# Patient Record
Sex: FEMALE | Employment: UNEMPLOYED | URBAN - METROPOLITAN AREA
[De-identification: names, ages, dates, MRNs, and addresses within clinical notes are randomized per-mention and may not be internally consistent; named-entity substitution may affect disease eponyms.]

---

## 2020-03-15 LAB
ABO: NORMAL
AMPHETAMINE SCREEN, URINE: ABNORMAL
ANTIBODY SCREEN: NORMAL
BARBITURATE SCREEN, URINE: ABNORMAL
BENZODIAZEPINE SCREEN, URINE: ABNORMAL
CANNABINOID SCREEN URINE: ABNORMAL
COCAINE METABOLITE SCREEN URINE: ABNORMAL
ERYTHROCYTE [DISTWIDTH] IN BLOOD BY AUTOMATED COUNT: 13.2 % (ref 11.5–14)
HCT VFR BLD CALC: 37.6 % (ref 36–46)
HEMOGLOBIN: 11.9 G/DL (ref 12–16)
Lab: ABNORMAL
MCHC RBC AUTO-ENTMCNC: 31.6 G/DL (ref 32–36)
MCV RBC AUTO: 84 FL (ref 80–100)
METHADONE SCREEN, URINE: ABNORMAL
OPIATE SCREEN, URINE: ABNORMAL
OXYCODONE SCREEN URINE: ABNORMAL
PHENCYCLIDINE SCREEN URINE: ABNORMAL
PLATELET # BLD: 306 X10E9/L (ref 150–450)
RBC # BLD: 4.46 X10E12/L (ref 4–5.2)
RH TYPE: NORMAL
WBC: 9.6 X10E9/L (ref 4.4–11.3)

## 2020-03-16 LAB
HEPATITIS B SURFACE ANTIGEN: NONREACTIVE
HEPATITIS C ANTIBODY: NONREACTIVE
HIV AG/AB: NONREACTIVE
SPECIMEN SOURCE: NORMAL
SYPHILIS TREPONEMAL ANTIBODY: NONREACTIVE

## 2020-03-17 LAB
ERYTHROCYTE [DISTWIDTH] IN BLOOD BY AUTOMATED COUNT: 13.3 % (ref 11.5–14)
HCT VFR BLD CALC: 29.6 % (ref 36–46)
HEMOGLOBIN: 9.5 G/DL (ref 12–16)
MCHC RBC AUTO-ENTMCNC: 32.1 G/DL (ref 32–36)
MCV RBC AUTO: 83 FL (ref 80–100)
PLATELET # BLD: 282 X10E9/L (ref 150–450)
RBC # BLD: 3.55 X10E12/L (ref 4–5.2)
WBC: 13.7 X10E9/L (ref 4.4–11.3)

## 2020-03-18 LAB — SURGICAL PATHOLOGY REPORT: NORMAL

## 2021-10-19 ENCOUNTER — OFFICE VISIT (OUTPATIENT)
Dept: OBGYN CLINIC | Age: 26
End: 2021-10-19
Payer: COMMERCIAL

## 2021-10-19 VITALS
DIASTOLIC BLOOD PRESSURE: 66 MMHG | HEART RATE: 88 BPM | BODY MASS INDEX: 23.04 KG/M2 | WEIGHT: 130 LBS | SYSTOLIC BLOOD PRESSURE: 108 MMHG | HEIGHT: 63 IN

## 2021-10-19 DIAGNOSIS — N91.1 AMENORRHEA, SECONDARY: ICD-10-CM

## 2021-10-19 DIAGNOSIS — Z32.01 PREGNANCY CONFIRMED BY POSITIVE URINE TEST: ICD-10-CM

## 2021-10-19 DIAGNOSIS — N91.1 AMENORRHEA, SECONDARY: Primary | ICD-10-CM

## 2021-10-19 LAB
ALBUMIN SERPL-MCNC: 3.9 G/DL (ref 3.5–4.6)
ALP BLD-CCNC: 107 U/L (ref 40–130)
ALT SERPL-CCNC: 7 U/L (ref 0–33)
ANION GAP SERPL CALCULATED.3IONS-SCNC: 11 MEQ/L (ref 9–15)
AST SERPL-CCNC: 12 U/L (ref 0–35)
BASOPHILS ABSOLUTE: 0.1 K/UL (ref 0–0.2)
BASOPHILS RELATIVE PERCENT: 0.6 %
BILIRUB SERPL-MCNC: <0.2 MG/DL (ref 0.2–0.7)
BUN BLDV-MCNC: 6 MG/DL (ref 6–20)
CALCIUM SERPL-MCNC: 8.9 MG/DL (ref 8.5–9.9)
CHLORIDE BLD-SCNC: 100 MEQ/L (ref 95–107)
CO2: 25 MEQ/L (ref 20–31)
CREAT SERPL-MCNC: 0.43 MG/DL (ref 0.5–0.9)
EOSINOPHILS ABSOLUTE: 0.1 K/UL (ref 0–0.7)
EOSINOPHILS RELATIVE PERCENT: 0.5 %
GFR AFRICAN AMERICAN: >60
GFR NON-AFRICAN AMERICAN: >60
GLOBULIN: 2.8 G/DL (ref 2.3–3.5)
GLUCOSE BLD-MCNC: 79 MG/DL (ref 70–99)
HCG, URINE, POC: POSITIVE
HCT VFR BLD CALC: 35 % (ref 37–47)
HEMOGLOBIN: 11.5 G/DL (ref 12–16)
LYMPHOCYTES ABSOLUTE: 2 K/UL (ref 1–4.8)
LYMPHOCYTES RELATIVE PERCENT: 17.9 %
Lab: ABNORMAL
MCH RBC QN AUTO: 28.2 PG (ref 27–31.3)
MCHC RBC AUTO-ENTMCNC: 33 % (ref 33–37)
MCV RBC AUTO: 85.5 FL (ref 82–100)
MONOCYTES ABSOLUTE: 0.7 K/UL (ref 0.2–0.8)
MONOCYTES RELATIVE PERCENT: 6.3 %
NEGATIVE QC PASS/FAIL: ABNORMAL
NEUTROPHILS ABSOLUTE: 8.4 K/UL (ref 1.4–6.5)
NEUTROPHILS RELATIVE PERCENT: 74.7 %
PDW BLD-RTO: 14.2 % (ref 11.5–14.5)
PLATELET # BLD: 348 K/UL (ref 130–400)
POSITIVE QC PASS/FAIL: ABNORMAL
POTASSIUM SERPL-SCNC: 3.6 MEQ/L (ref 3.4–4.9)
RBC # BLD: 4.1 M/UL (ref 4.2–5.4)
RUBELLA ANTIBODY IGG: 206.1 IU/ML
SODIUM BLD-SCNC: 136 MEQ/L (ref 135–144)
TOTAL PROTEIN: 6.7 G/DL (ref 6.3–8)
WBC # BLD: 11.2 K/UL (ref 4.8–10.8)

## 2021-10-19 PROCEDURE — G8420 CALC BMI NORM PARAMETERS: HCPCS | Performed by: OBSTETRICS & GYNECOLOGY

## 2021-10-19 PROCEDURE — 99204 OFFICE O/P NEW MOD 45 MIN: CPT | Performed by: OBSTETRICS & GYNECOLOGY

## 2021-10-19 PROCEDURE — G8484 FLU IMMUNIZE NO ADMIN: HCPCS | Performed by: OBSTETRICS & GYNECOLOGY

## 2021-10-19 PROCEDURE — 1036F TOBACCO NON-USER: CPT | Performed by: OBSTETRICS & GYNECOLOGY

## 2021-10-19 PROCEDURE — G8427 DOCREV CUR MEDS BY ELIG CLIN: HCPCS | Performed by: OBSTETRICS & GYNECOLOGY

## 2021-10-19 PROCEDURE — 81025 URINE PREGNANCY TEST: CPT | Performed by: OBSTETRICS & GYNECOLOGY

## 2021-10-19 ASSESSMENT — ENCOUNTER SYMPTOMS
COUGH: 0
ABDOMINAL PAIN: 0
APNEA: 0
ABDOMINAL DISTENTION: 0
NAUSEA: 0
DIARRHEA: 0
BLOOD IN STOOL: 0
SHORTNESS OF BREATH: 0
SORE THROAT: 0
VOMITING: 0
WHEEZING: 0
CONSTIPATION: 0

## 2021-10-19 NOTE — PROGRESS NOTES
Subjective:      Patient ID:  Amelie Welch is a 27 y.o. female with chief complaint of:  Chief Complaint   Patient presents with    Amenorrhea     Late prenantal care no c/o at this time, +FM        Patient presents today for confirmation of pregnancy. positive pregnancy test last menstrual early august. No care or ultrasound at this time. No  Bleeding no headaches no blurred vision. Late care with second baby unsure the dates but was believed to be 4 weeks early however not premature weight or staples most likely term infant      History reviewed. No pertinent past medical history. History reviewed. No pertinent surgical history. No family history on file. No current outpatient medications on file prior to visit. No current facility-administered medications on file prior to visit. Allergies:  Patient has no known allergies. Review of Systems   Constitutional: Negative for activity change, appetite change, fatigue, fever and unexpected weight change. HENT: Negative for nosebleeds and sore throat. Eyes: Negative for visual disturbance. Respiratory: Negative for apnea, cough, shortness of breath and wheezing. Cardiovascular: Negative for chest pain, palpitations and leg swelling. Gastrointestinal: Negative for abdominal distention, abdominal pain, blood in stool, constipation, diarrhea, nausea and vomiting. Endocrine: Negative for cold intolerance, heat intolerance, polydipsia and polyuria. Genitourinary: Negative for difficulty urinating, dyspareunia, dysuria, frequency, genital sores, hematuria, menstrual problem, pelvic pain, urgency, vaginal bleeding, vaginal discharge and vaginal pain. Musculoskeletal: Negative for arthralgias. Skin: Negative for rash. Neurological: Negative for dizziness, weakness, light-headedness and headaches. Hematological: Negative for adenopathy. Does not bruise/bleed easily.    Psychiatric/Behavioral: Negative for agitation, confusion, dysphoric mood and sleep disturbance. Objective:   /66   Pulse 88   Ht 5' 3\" (1.6 m)   Wt 130 lb (59 kg)   LMP 06/07/2021   BMI 23.03 kg/m²      Physical Exam  Constitutional:       Appearance: Normal appearance. She is well-developed. Eyes:      Pupils: Pupils are equal, round, and reactive to light. Cardiovascular:      Rate and Rhythm: Normal rate and regular rhythm. Heart sounds: Normal heart sounds. Pulmonary:      Effort: Pulmonary effort is normal.   Abdominal:      General: Bowel sounds are normal.      Palpations: Abdomen is soft. Comments: Fundus palpable 144 fhr   Genitourinary:     Labia:         Right: No rash, tenderness or lesion. Left: No rash, tenderness or lesion. Vagina: No vaginal discharge, erythema, tenderness or bleeding. Cervix: No discharge or lesion. Uterus: Enlarged. Not tender. Adnexa:         Right: No mass, tenderness or fullness. Left: No mass, tenderness or fullness. Musculoskeletal:      Right lower leg: No edema. Left lower leg: No edema. Neurological:      Mental Status: She is alert and oriented to person, place, and time. Psychiatric:         Mood and Affect: Mood normal.         Behavior: Behavior normal.         Assessment:       Diagnosis Orders   1. Amenorrhea, secondary  C.trachomatis N.gonorrhoeae DNA, Urine    CBC Auto Differential    Culture, Urine    Cystic fibrosis carrier study    Drug Panel 9A Screen, Urine    Hemoglobinopathy Eval (Electrophoresis)    Hepatitis B Surface Antigen    Herpes Simplex Virus (HSV) I Glycoprotein Antibody IgG    Herpes Simplex Virus (HSV) II Glycoprotein Antibody IgG    HIV Screen    POC Pregnancy Urine Qual    RPR Reflex to Titer and TPPA    Rubella antibody, IgG    Type and screen    Urinalysis    Varicella Zoster Antibody, IgG    Wet prep, genital    Comprehensive Metabolic Panel    US OB TRANSVAGINAL    US OB 14 PLUS WEEKS SINGLE OR FIRST GESTATION   2.  Pregnancy confirmed by positive urine test  C.trachomatis N.gonorrhoeae DNA, Urine    CBC Auto Differential    Culture, Urine    Cystic fibrosis carrier study    Drug Panel 9A Screen, Urine    Hemoglobinopathy Eval (Electrophoresis)    Hepatitis B Surface Antigen    Herpes Simplex Virus (HSV) I Glycoprotein Antibody IgG    Herpes Simplex Virus (HSV) II Glycoprotein Antibody IgG    HIV Screen    POC Pregnancy Urine Qual    RPR Reflex to Titer and TPPA    Rubella antibody, IgG    Type and screen    Urinalysis    Varicella Zoster Antibody, IgG    Wet prep, genital    Comprehensive Metabolic Panel    US OB TRANSVAGINAL    US OB 14 PLUS WEEKS SINGLE OR FIRST GESTATION         Plan:      Orders Placed This Encounter   Procedures    C.trachomatis N.gonorrhoeae DNA, Urine     Standing Status:   Future     Number of Occurrences:   1     Standing Expiration Date:   10/18/2022    Culture, Urine     Standing Status:   Future     Standing Expiration Date:   10/18/2022     Order Specific Question:   Specify (ex-cath, midstream, cysto, etc)?      Answer:   Midstream    Wet prep, genital     Standing Status:   Future     Number of Occurrences:   1     Standing Expiration Date:   10/18/2022    US OB TRANSVAGINAL     Standing Status:   Future     Standing Expiration Date:   10/19/2022    US OB 14 PLUS WEEKS SINGLE OR FIRST GESTATION     Standing Status:   Future     Standing Expiration Date:   10/19/2022     Order Specific Question:   Reason for exam:     Answer:   20 week routine, dating    CBC Auto Differential     Standing Status:   Future     Number of Occurrences:   1     Standing Expiration Date:   10/18/2022    Cystic fibrosis carrier study     Standing Status:   Future     Number of Occurrences:   1     Standing Expiration Date:   10/18/2022     Order Specific Question:   CF1-CF Symptom? (Y/N/Unknown)     Answer:   Unknown     Order Specific Question:   CF3-Any Family History of CF? (Y/N)     Answer:   No     Order Specific Question:   CF Speciment? Answer:   Blood    Drug Panel 9A Screen, Urine     Standing Status:   Future     Standing Expiration Date:   10/18/2022    Hemoglobinopathy Eval (Electrophoresis)     Standing Status:   Future     Number of Occurrences:   1     Standing Expiration Date:   10/18/2022    Hepatitis B Surface Antigen     Standing Status:   Future     Number of Occurrences:   1     Standing Expiration Date:   10/18/2022    Herpes Simplex Virus (HSV) I Glycoprotein Antibody IgG     Standing Status:   Future     Number of Occurrences:   1     Standing Expiration Date:   10/18/2022    Herpes Simplex Virus (HSV) II Glycoprotein Antibody IgG     Standing Status:   Future     Number of Occurrences:   1     Standing Expiration Date:   10/18/2022    HIV Screen     Standing Status:   Future     Number of Occurrences:   1     Standing Expiration Date:   10/18/2022    RPR Reflex to Titer and TPPA     Standing Status:   Future     Number of Occurrences:   1     Standing Expiration Date:   10/18/2022    Rubella antibody, IgG     Standing Status:   Future     Number of Occurrences:   1     Standing Expiration Date:   10/18/2022    Urinalysis     Standing Status:   Future     Standing Expiration Date:   10/18/2022    Varicella Zoster Antibody, IgG     Standing Status:   Future     Number of Occurrences:   1     Standing Expiration Date:   10/18/2022    Comprehensive Metabolic Panel     Standing Status:   Future     Number of Occurrences:   1     Standing Expiration Date:   10/19/2022    POC Pregnancy Urine Qual    Type and screen     Standing Status:   Future     Number of Occurrences:   1     Standing Expiration Date:   10/18/2022     No orders of the defined types were placed in this encounter. Return in about 3 weeks (around 11/9/2021).      Leta Carlos DO

## 2021-10-20 LAB
ABO/RH: NORMAL
ANTIBODY SCREEN: NORMAL
CLUE CELLS: NORMAL
HEPATITIS B SURFACE ANTIGEN INTERPRETATION: NORMAL
HIV AG/AB: NONREACTIVE
RPR: NORMAL
TRICHOMONAS PREP: NORMAL
TRICHOMONAS VAGINALIS SCREEN: POSITIVE
YEAST WET PREP: NORMAL

## 2021-10-22 LAB
C TRACH DNA GENITAL QL NAA+PROBE: NEGATIVE
HEMOGLOBIN A-1 QUANTITATION: 96.1 % (ref 95–97.9)
HEMOGLOBIN A2 QUANTITATION: 3 % (ref 2–3.5)
HEMOGLOBIN C QUANTITATION: 0 % (ref 0–0)
HEMOGLOBIN E QUANTITATION: 0 % (ref 0–0)
HEMOGLOBIN ELECTROPHORESIS: NORMAL
HEMOGLOBIN EVALUATION: NORMAL
HEMOGLOBIN F QUANTITATION: 0.9 % (ref 0–2.1)
HEMOGLOBIN OTHER: 0 % (ref 0–0)
HEMOGLOBIN S QUANTITATION: 0 % (ref 0–0)
HSV 1 GLYCOPROTEIN G AB IGG: 0.2 IV
HSV 2 GLYCOPROTEIN G AB IGG: 0.37 IV
N. GONORRHOEAE DNA: NEGATIVE
SICKLE CELL: NORMAL
VZV IGG SER QL IA: 994.4 IV

## 2021-10-23 RX ORDER — METRONIDAZOLE 500 MG/1
500 TABLET ORAL 2 TIMES DAILY
Qty: 14 TABLET | Refills: 0 | Status: SHIPPED | OUTPATIENT
Start: 2021-10-23 | End: 2021-10-30

## 2021-10-27 LAB
CYSTIC FIBROSIS 165 VARIANTS INTERP: NORMAL
CYSTIC FIBROSIS 5T VARIANT: NORMAL
CYSTIC FIBROSIS ALLELE 1: NEGATIVE
CYSTIC FIBROSIS ALLELE 2: NEGATIVE

## 2021-11-08 ENCOUNTER — HOSPITAL ENCOUNTER (OUTPATIENT)
Dept: ULTRASOUND IMAGING | Age: 26
Discharge: HOME | End: 2021-11-10
Payer: COMMERCIAL

## 2021-11-08 DIAGNOSIS — Z32.01 PREGNANCY CONFIRMED BY POSITIVE URINE TEST: ICD-10-CM

## 2021-11-08 DIAGNOSIS — N91.1 AMENORRHEA, SECONDARY: ICD-10-CM

## 2021-11-08 PROCEDURE — 76805 OB US >/= 14 WKS SNGL FETUS: CPT

## 2021-11-16 ENCOUNTER — INITIAL PRENATAL (OUTPATIENT)
Dept: OBGYN CLINIC | Age: 26
End: 2021-11-16

## 2021-11-16 VITALS
WEIGHT: 128 LBS | HEART RATE: 83 BPM | SYSTOLIC BLOOD PRESSURE: 112 MMHG | BODY MASS INDEX: 22.67 KG/M2 | DIASTOLIC BLOOD PRESSURE: 70 MMHG

## 2021-11-16 DIAGNOSIS — Z3A.21 21 WEEKS GESTATION OF PREGNANCY: ICD-10-CM

## 2021-11-16 DIAGNOSIS — Z34.80 SUPERVISION OF OTHER NORMAL PREGNANCY, ANTEPARTUM: ICD-10-CM

## 2021-11-16 DIAGNOSIS — Z34.80 SUPERVISION OF OTHER NORMAL PREGNANCY, ANTEPARTUM: Primary | ICD-10-CM

## 2021-11-16 DIAGNOSIS — Q66.89 BILATERAL CLUB FEET: ICD-10-CM

## 2021-11-16 RX ORDER — METRONIDAZOLE 500 MG/1
500 TABLET ORAL 2 TIMES DAILY
Qty: 14 TABLET | Refills: 0 | Status: SHIPPED | OUTPATIENT
Start: 2021-11-16 | End: 2021-11-23

## 2021-11-16 RX ORDER — ONDANSETRON 4 MG/1
4 TABLET, ORALLY DISINTEGRATING ORAL 3 TIMES DAILY PRN
Qty: 21 TABLET | Refills: 2 | Status: ON HOLD | OUTPATIENT
Start: 2021-11-16 | End: 2022-02-26 | Stop reason: HOSPADM

## 2021-11-19 LAB
AFP INTERPRETATION: NORMAL
AFP MOM: 1.78
AFP SPECIMEN: NORMAL
D-INHIBIN: 210 PG/ML
DATING: NORMAL
EER MATERNAL SCREEN AFP, HCG, EST, INH: NORMAL
ESTIMATED DUE DATE: NORMAL
FETUS COUNT: NORMAL
GESTATIONAL AGE CALC AT COLLECT: NORMAL
HISTORY OF ANEUPLOIDY?: NO
HISTORY/NEURAL TUBE DEFECTS: NO
INSULIN DEP. DIABETIC: NO
MATERNAL AGE AT EDD: 28.5 YR
MATERNAL WEIGHT: NORMAL
MOM FOR HCG, 2ND TRIMESTER: 0.8
MOM FOR UE3: 0.51
MOM INHIBN: 0.8
PATIENT'S HCG, 2ND TRIMESTER: NORMAL IU/L
PT AFP: 145 NG/ML
PT UE3: 1.47 NG/ML
RACE: NORMAL
SMOKING: YES

## 2022-02-25 ENCOUNTER — HOSPITAL ENCOUNTER (INPATIENT)
Age: 27
LOS: 2 days | Discharge: HOME | DRG: 560 | End: 2022-02-27
Attending: OBSTETRICS & GYNECOLOGY | Admitting: OBSTETRICS & GYNECOLOGY
Payer: COMMERCIAL

## 2022-02-25 LAB
ABO/RH: NORMAL
ALBUMIN SERPL-MCNC: 3.7 G/DL (ref 3.5–4.6)
ALP BLD-CCNC: 208 U/L (ref 40–130)
ALT SERPL-CCNC: 122 U/L (ref 0–33)
AMPHETAMINE SCREEN, URINE: ABNORMAL
ANION GAP SERPL CALCULATED.3IONS-SCNC: 14 MEQ/L (ref 9–15)
ANTIBODY SCREEN: NORMAL
AST SERPL-CCNC: 94 U/L (ref 0–35)
BACTERIA: ABNORMAL /HPF
BARBITURATE SCREEN URINE: ABNORMAL
BASOPHILS ABSOLUTE: 0 K/UL (ref 0–0.2)
BASOPHILS RELATIVE PERCENT: 0.2 %
BENZODIAZEPINE SCREEN, URINE: POSITIVE
BILIRUB SERPL-MCNC: 0.6 MG/DL (ref 0.2–0.7)
BILIRUBIN URINE: ABNORMAL
BLOOD, URINE: ABNORMAL
BUN BLDV-MCNC: 10 MG/DL (ref 6–20)
CALCIUM SERPL-MCNC: 8.9 MG/DL (ref 8.5–9.9)
CANNABINOID SCREEN URINE: POSITIVE
CHLORIDE BLD-SCNC: 93 MEQ/L (ref 95–107)
CLARITY: ABNORMAL
CO2: 29 MEQ/L (ref 20–31)
COCAINE METABOLITE SCREEN URINE: ABNORMAL
COLOR: ABNORMAL
CREAT SERPL-MCNC: 0.47 MG/DL (ref 0.5–0.9)
EOSINOPHILS ABSOLUTE: 0 K/UL (ref 0–0.7)
EOSINOPHILS RELATIVE PERCENT: 0.1 %
EPITHELIAL CELLS, UA: ABNORMAL /HPF (ref 0–5)
GFR AFRICAN AMERICAN: >60
GFR NON-AFRICAN AMERICAN: >60
GLOBULIN: 3.3 G/DL (ref 2.3–3.5)
GLUCOSE BLD-MCNC: 94 MG/DL (ref 70–99)
GLUCOSE URINE: NEGATIVE MG/DL
HCT VFR BLD CALC: 37.3 % (ref 37–47)
HEMOGLOBIN: 12.5 G/DL (ref 12–16)
HEPATITIS B SURFACE ANTIGEN INTERPRETATION: NORMAL
HIV AG/AB: NONREACTIVE
KETONES, URINE: NEGATIVE MG/DL
LEUKOCYTE ESTERASE, URINE: ABNORMAL
LYMPHOCYTES ABSOLUTE: 1 K/UL (ref 1–4.8)
LYMPHOCYTES RELATIVE PERCENT: 8 %
Lab: ABNORMAL
MCH RBC QN AUTO: 27.8 PG (ref 27–31.3)
MCHC RBC AUTO-ENTMCNC: 33.6 % (ref 33–37)
MCV RBC AUTO: 82.7 FL (ref 82–100)
METHADONE SCREEN, URINE: ABNORMAL
MONOCYTES ABSOLUTE: 0.9 K/UL (ref 0.2–0.8)
MONOCYTES RELATIVE PERCENT: 7.4 %
NEUTROPHILS ABSOLUTE: 10.5 K/UL (ref 1.4–6.5)
NEUTROPHILS RELATIVE PERCENT: 84.3 %
NITRITE, URINE: POSITIVE
OPIATE SCREEN URINE: ABNORMAL
OXYCODONE URINE: POSITIVE
PDW BLD-RTO: 13.2 % (ref 11.5–14.5)
PH UA: >=9 (ref 5–9)
PHENCYCLIDINE SCREEN URINE: ABNORMAL
PLATELET # BLD: 240 K/UL (ref 130–400)
POTASSIUM SERPL-SCNC: 2.9 MEQ/L (ref 3.4–4.9)
PROPOXYPHENE SCREEN: ABNORMAL
PROTEIN UA: >=300 MG/DL
RBC # BLD: 4.51 M/UL (ref 4.2–5.4)
RBC UA: >100 /HPF (ref 0–5)
RPR: NORMAL
RUBELLA ANTIBODY IGG: 165.5 IU/ML
SARS-COV-2, NAAT: NOT DETECTED
SODIUM BLD-SCNC: 136 MEQ/L (ref 135–144)
SPECIFIC GRAVITY UA: 1.02 (ref 1–1.03)
TOTAL PROTEIN: 7 G/DL (ref 6.3–8)
UROBILINOGEN, URINE: 0.2 E.U./DL
WBC # BLD: 12.5 K/UL (ref 4.8–10.8)
WBC UA: ABNORMAL /HPF (ref 0–5)

## 2022-02-25 PROCEDURE — 87635 SARS-COV-2 COVID-19 AMP PRB: CPT

## 2022-02-25 PROCEDURE — 59414 DELIVER PLACENTA: CPT | Performed by: OBSTETRICS & GYNECOLOGY

## 2022-02-25 PROCEDURE — 88307 TISSUE EXAM BY PATHOLOGIST: CPT

## 2022-02-25 PROCEDURE — 0HQ9XZZ REPAIR PERINEUM SKIN, EXTERNAL APPROACH: ICD-10-PCS | Performed by: OBSTETRICS & GYNECOLOGY

## 2022-02-25 PROCEDURE — 86900 BLOOD TYPING SEROLOGIC ABO: CPT

## 2022-02-25 PROCEDURE — 6360000002 HC RX W HCPCS

## 2022-02-25 PROCEDURE — 80307 DRUG TEST PRSMV CHEM ANLYZR: CPT

## 2022-02-25 PROCEDURE — 2500000003 HC RX 250 WO HCPCS

## 2022-02-25 PROCEDURE — 86901 BLOOD TYPING SEROLOGIC RH(D): CPT

## 2022-02-25 PROCEDURE — 6370000000 HC RX 637 (ALT 250 FOR IP): Performed by: OBSTETRICS & GYNECOLOGY

## 2022-02-25 PROCEDURE — 85025 COMPLETE CBC W/AUTO DIFF WBC: CPT

## 2022-02-25 PROCEDURE — 87340 HEPATITIS B SURFACE AG IA: CPT

## 2022-02-25 PROCEDURE — 86592 SYPHILIS TEST NON-TREP QUAL: CPT

## 2022-02-25 PROCEDURE — 81001 URINALYSIS AUTO W/SCOPE: CPT

## 2022-02-25 PROCEDURE — 1220000000 HC SEMI PRIVATE OB R&B

## 2022-02-25 PROCEDURE — 87389 HIV-1 AG W/HIV-1&-2 AB AG IA: CPT

## 2022-02-25 PROCEDURE — 2580000003 HC RX 258

## 2022-02-25 PROCEDURE — 86850 RBC ANTIBODY SCREEN: CPT

## 2022-02-25 PROCEDURE — 86762 RUBELLA ANTIBODY: CPT

## 2022-02-25 PROCEDURE — 80053 COMPREHEN METABOLIC PANEL: CPT

## 2022-02-25 RX ORDER — ONDANSETRON 2 MG/ML
4 INJECTION INTRAMUSCULAR; INTRAVENOUS EVERY 6 HOURS PRN
Status: DISCONTINUED | OUTPATIENT
Start: 2022-02-25 | End: 2022-02-27 | Stop reason: HOSPADM

## 2022-02-25 RX ORDER — SODIUM CHLORIDE, SODIUM LACTATE, POTASSIUM CHLORIDE, CALCIUM CHLORIDE 600; 310; 30; 20 MG/100ML; MG/100ML; MG/100ML; MG/100ML
INJECTION, SOLUTION INTRAVENOUS CONTINUOUS
Status: DISCONTINUED | OUTPATIENT
Start: 2022-02-25 | End: 2022-02-25

## 2022-02-25 RX ORDER — SODIUM CHLORIDE, SODIUM LACTATE, POTASSIUM CHLORIDE, AND CALCIUM CHLORIDE .6; .31; .03; .02 G/100ML; G/100ML; G/100ML; G/100ML
500 INJECTION, SOLUTION INTRAVENOUS PRN
Status: DISCONTINUED | OUTPATIENT
Start: 2022-02-25 | End: 2022-02-25

## 2022-02-25 RX ORDER — LIDOCAINE HYDROCHLORIDE 10 MG/ML
INJECTION, SOLUTION EPIDURAL; INFILTRATION; INTRACAUDAL; PERINEURAL
Status: COMPLETED
Start: 2022-02-25 | End: 2022-02-25

## 2022-02-25 RX ORDER — ACETAMINOPHEN 325 MG/1
650 TABLET ORAL EVERY 4 HOURS PRN
Status: DISCONTINUED | OUTPATIENT
Start: 2022-02-25 | End: 2022-02-25

## 2022-02-25 RX ORDER — DOCUSATE SODIUM 100 MG/1
100 CAPSULE, LIQUID FILLED ORAL 2 TIMES DAILY PRN
Status: DISCONTINUED | OUTPATIENT
Start: 2022-02-25 | End: 2022-02-25

## 2022-02-25 RX ORDER — SODIUM CHLORIDE 9 MG/ML
25 INJECTION, SOLUTION INTRAVENOUS PRN
Status: DISCONTINUED | OUTPATIENT
Start: 2022-02-25 | End: 2022-02-25

## 2022-02-25 RX ORDER — OXYCODONE HYDROCHLORIDE AND ACETAMINOPHEN 5; 325 MG/1; MG/1
2 TABLET ORAL EVERY 4 HOURS PRN
Status: DISCONTINUED | OUTPATIENT
Start: 2022-02-25 | End: 2022-02-27 | Stop reason: HOSPADM

## 2022-02-25 RX ORDER — IBUPROFEN 600 MG/1
600 TABLET ORAL EVERY 6 HOURS PRN
Status: DISCONTINUED | OUTPATIENT
Start: 2022-02-25 | End: 2022-02-27 | Stop reason: HOSPADM

## 2022-02-25 RX ORDER — OXYCODONE HYDROCHLORIDE AND ACETAMINOPHEN 5; 325 MG/1; MG/1
1 TABLET ORAL EVERY 4 HOURS PRN
Status: DISCONTINUED | OUTPATIENT
Start: 2022-02-25 | End: 2022-02-27 | Stop reason: HOSPADM

## 2022-02-25 RX ORDER — OXYTOCIN 10 [USP'U]/ML
INJECTION, SOLUTION INTRAMUSCULAR; INTRAVENOUS
Status: DISCONTINUED
Start: 2022-02-25 | End: 2022-02-25

## 2022-02-25 RX ORDER — DIPHENHYDRAMINE HCL 25 MG
25 TABLET ORAL EVERY 4 HOURS PRN
Status: DISCONTINUED | OUTPATIENT
Start: 2022-02-25 | End: 2022-02-25

## 2022-02-25 RX ORDER — SODIUM CHLORIDE 0.9 % (FLUSH) 0.9 %
5-40 SYRINGE (ML) INJECTION EVERY 12 HOURS SCHEDULED
Status: DISCONTINUED | OUTPATIENT
Start: 2022-02-25 | End: 2022-02-27 | Stop reason: HOSPADM

## 2022-02-25 RX ORDER — DOCUSATE SODIUM 100 MG/1
100 CAPSULE, LIQUID FILLED ORAL DAILY
Status: DISCONTINUED | OUTPATIENT
Start: 2022-02-25 | End: 2022-02-27 | Stop reason: HOSPADM

## 2022-02-25 RX ORDER — ACETAMINOPHEN 325 MG/1
650 TABLET ORAL EVERY 4 HOURS PRN
Status: DISCONTINUED | OUTPATIENT
Start: 2022-02-25 | End: 2022-02-27 | Stop reason: HOSPADM

## 2022-02-25 RX ORDER — SODIUM CHLORIDE, SODIUM LACTATE, POTASSIUM CHLORIDE, CALCIUM CHLORIDE 600; 310; 30; 20 MG/100ML; MG/100ML; MG/100ML; MG/100ML
INJECTION, SOLUTION INTRAVENOUS
Status: COMPLETED
Start: 2022-02-25 | End: 2022-02-25

## 2022-02-25 RX ORDER — POTASSIUM CHLORIDE 750 MG/1
40 TABLET, FILM COATED, EXTENDED RELEASE ORAL ONCE
Status: COMPLETED | OUTPATIENT
Start: 2022-02-25 | End: 2022-02-25

## 2022-02-25 RX ORDER — SODIUM CHLORIDE 0.9 % (FLUSH) 0.9 %
5-40 SYRINGE (ML) INJECTION PRN
Status: DISCONTINUED | OUTPATIENT
Start: 2022-02-25 | End: 2022-02-25

## 2022-02-25 RX ORDER — SODIUM CHLORIDE 9 MG/ML
25 INJECTION, SOLUTION INTRAVENOUS PRN
Status: DISCONTINUED | OUTPATIENT
Start: 2022-02-25 | End: 2022-02-27 | Stop reason: HOSPADM

## 2022-02-25 RX ORDER — NALBUPHINE HCL 10 MG/ML
5 AMPUL (ML) INJECTION
Status: DISCONTINUED | OUTPATIENT
Start: 2022-02-25 | End: 2022-02-25

## 2022-02-25 RX ORDER — SODIUM CHLORIDE 0.9 % (FLUSH) 0.9 %
5-40 SYRINGE (ML) INJECTION PRN
Status: DISCONTINUED | OUTPATIENT
Start: 2022-02-25 | End: 2022-02-27 | Stop reason: HOSPADM

## 2022-02-25 RX ORDER — BISACODYL 10 MG
10 SUPPOSITORY, RECTAL RECTAL DAILY PRN
Status: DISCONTINUED | OUTPATIENT
Start: 2022-02-25 | End: 2022-02-27 | Stop reason: HOSPADM

## 2022-02-25 RX ORDER — MODIFIED LANOLIN
OINTMENT (GRAM) TOPICAL PRN
Status: DISCONTINUED | OUTPATIENT
Start: 2022-02-25 | End: 2022-02-27 | Stop reason: HOSPADM

## 2022-02-25 RX ORDER — SODIUM CHLORIDE 0.9 % (FLUSH) 0.9 %
5-40 SYRINGE (ML) INJECTION EVERY 12 HOURS SCHEDULED
Status: DISCONTINUED | OUTPATIENT
Start: 2022-02-25 | End: 2022-02-25

## 2022-02-25 RX ORDER — ONDANSETRON 2 MG/ML
4 INJECTION INTRAMUSCULAR; INTRAVENOUS EVERY 6 HOURS PRN
Status: DISCONTINUED | OUTPATIENT
Start: 2022-02-25 | End: 2022-02-25

## 2022-02-25 RX ORDER — SODIUM CHLORIDE, SODIUM LACTATE, POTASSIUM CHLORIDE, AND CALCIUM CHLORIDE .6; .31; .03; .02 G/100ML; G/100ML; G/100ML; G/100ML
1000 INJECTION, SOLUTION INTRAVENOUS PRN
Status: DISCONTINUED | OUTPATIENT
Start: 2022-02-25 | End: 2022-02-25

## 2022-02-25 RX ORDER — IBUPROFEN 600 MG/1
600 TABLET ORAL EVERY 6 HOURS PRN
Status: DISCONTINUED | OUTPATIENT
Start: 2022-02-25 | End: 2022-02-25

## 2022-02-25 RX ADMIN — IBUPROFEN 600 MG: 600 TABLET ORAL at 18:20

## 2022-02-25 RX ADMIN — BENZOCAINE AND LEVOMENTHOL: 200; 5 SPRAY TOPICAL at 02:58

## 2022-02-25 RX ADMIN — POTASSIUM CHLORIDE 40 MEQ: 750 TABLET, FILM COATED, EXTENDED RELEASE ORAL at 05:04

## 2022-02-25 RX ADMIN — LIDOCAINE HYDROCHLORIDE 30 ML: 10 INJECTION, SOLUTION EPIDURAL; INFILTRATION; INTRACAUDAL; PERINEURAL at 02:39

## 2022-02-25 RX ADMIN — IBUPROFEN 600 MG: 600 TABLET ORAL at 11:17

## 2022-02-25 RX ADMIN — DOCUSATE SODIUM 100 MG: 100 CAPSULE, LIQUID FILLED ORAL at 09:20

## 2022-02-25 RX ADMIN — BENZOCAINE AND LEVOMENTHOL: 200; 5 SPRAY TOPICAL at 05:47

## 2022-02-25 RX ADMIN — SODIUM CHLORIDE, POTASSIUM CHLORIDE, SODIUM LACTATE AND CALCIUM CHLORIDE 1000 ML: 600; 310; 30; 20 INJECTION, SOLUTION INTRAVENOUS at 02:45

## 2022-02-25 RX ADMIN — Medication 30 UNITS: at 02:25

## 2022-02-25 RX ADMIN — OXYCODONE AND ACETAMINOPHEN 1 TABLET: 5; 325 TABLET ORAL at 06:36

## 2022-02-25 RX ADMIN — IBUPROFEN 600 MG: 600 TABLET ORAL at 02:58

## 2022-02-25 ASSESSMENT — PAIN SCALES - GENERAL
PAINLEVEL_OUTOF10: 2
PAINLEVEL_OUTOF10: 7
PAINLEVEL_OUTOF10: 6
PAINLEVEL_OUTOF10: 7
PAINLEVEL_OUTOF10: 8
PAINLEVEL_OUTOF10: 6
PAINLEVEL_OUTOF10: 4

## 2022-02-25 NOTE — H&P
Department of Obstetrics and Gynecology   History & Physical    Pt Name: Reanna Byrne  MRN: [id number] [place] #: [de-identified]  YOB: 1995  Estimated Date of Delivery: 3/28/22      HPI: The patient is a 27 y.o. [id number] 29w2d female who presents to [facility] triage for precipitous delivery at home. PT brought in via EMS. Pt states she started with CTX and delivered infant at home. Pt with limited/scant prenatal care with only one visit noted in . Pt reports h/o  x 2. Pt with retained placenta on entry to L&D. Allergies: Allergies as of 2022    (No Known Allergies)       Medications:    Current Facility-Administered Medications:     oxytocin (PITOCIN) 10 UNIT/ML injection, , , ,     oxytocin (PITOCIN) 30 units in 500 mL infusion Override Pull, , , ,     lidocaine PF 1 % injection, , , ,     OB History: [id number]    Gyn History: Denies h/o abnormal pap smear, h/o STDs. Past Medical History: No past medical history on file. Past Surgical History: No past surgical history on file. Social History:   Social History     Tobacco Use   Smoking Status Never Smoker   Smokeless Tobacco Never Used        Family History: Noncontributory; Denies h/o cancer. ROS:  Negative except as stated in HPI, denies nausea, vomiting, fever, chills, headache or dysuria. PE:  There were no vitals filed for this visit.     General: well nourished, well developed, in no acute distress  CV: Normal heart sounds  Resp: breathing unlabored  Abdomen: Nontender, no rebound, no guarding    Labs:   Blood Type/Rh: O POS    Group B Strep:  unknown    Assessment:   27 y.o. [id number] 35w4d female with precipitous delivery at home with retained placenta    Plan:   Admit for delivery    Brendon Simpson MD

## 2022-02-25 NOTE — FLOWSHEET NOTE
Encouraged patient to get up to restroom for urine sample and patient has not urinated since arrival. Discussed insertion of petit catheter. Patient up to restroom at this time. 4 mls out at this time. Sent to lab.  Encouraged patient to drink water and get up again

## 2022-02-25 NOTE — PLAN OF CARE
Problem: Discharge Planning:  Goal: Discharged to appropriate level of care  Description: Discharged to appropriate level of care  2/25/2022 1036 by Jermaine Carbajal RN  Outcome: Ongoing  2/25/2022 0601 by Jigar Melgar RN  Outcome: Ongoing     Problem: Constipation:  Goal: Bowel elimination is within specified parameters  Description: Bowel elimination is within specified parameters  2/25/2022 1036 by Jermaine Carbajal RN  Outcome: Ongoing  2/25/2022 0601 by Jigar Melgar RN  Outcome: Ongoing     Problem: Fluid Volume - Imbalance:  Goal: Absence of imbalanced fluid volume signs and symptoms  Description: Absence of imbalanced fluid volume signs and symptoms  2/25/2022 1036 by Jermaine Carbajal RN  Outcome: Ongoing  2/25/2022 0601 by Jigar Melgar RN  Outcome: Ongoing  Goal: Absence of postpartum hemorrhage signs and symptoms  Description: Absence of postpartum hemorrhage signs and symptoms  2/25/2022 1036 by Jermaine Carbajal RN  Outcome: Ongoing  2/25/2022 0601 by Jigar Melgar RN  Outcome: Ongoing     Problem: Infection - Risk of, Puerperal Infection:  Goal: Will show no infection signs and symptoms  Description: Will show no infection signs and symptoms  2/25/2022 1036 by Jermaine Carbajal RN  Outcome: Ongoing  2/25/2022 0601 by Jigar Melgar RN  Outcome: Ongoing     Problem: Mood - Altered:  Goal: Mood stable  Description: Mood stable  2/25/2022 1036 by Jermaine Carbajal RN  Outcome: Ongoing  2/25/2022 0601 by Jigar Melgar RN  Outcome: Ongoing     Problem: Pain - Acute:  Goal: Pain level will decrease  Description: Pain level will decrease  2/25/2022 1036 by Jermaine Carbajal RN  Outcome: Ongoing  2/25/2022 0601 by Jigar Melgar RN  Outcome: Ongoing

## 2022-02-25 NOTE — FLOWSHEET NOTE
In patients room. Encouraged her to get up to the bathroom. Discussed implications of full bladder. She wanted to sleep more. I offered bedpan and petit catheter.  Patient up to restroom at this time

## 2022-02-25 NOTE — OP NOTE
PROCEDURE NOTE: VAGINAL DELIVERY  27 y.o. [id number] at 35w4d GA who presented to L&D for precipitous delivery at home. Pt reports CTX followed by [facility] and desire to push. Pt reports infant was delivered without issue and EMS was called. EMS then clamped and cut umbilical cord. Infant brought to L&D viable and active. The pt did not deliver the placenta at home. On exam in L&D the placenta delivered spontaneously with fundal massage and gentle traction. The vagina and cervix were inspected and a first degree midline laceration was noted and repaired with interrupted stitch. The infant, a viable male infant was born at approximately 1:21 with Apgars 8 and 9, per EMS and wt pending. Bilateral club feet noted on infant. Mother was noted to have excellent uterine tone. Sponge and instrument counts were correct x 2 and mother and baby were recovered in room without difficulty.      EBL: 200cc - please see nursing notes/charting for further EBL    Uriel Bright MD

## 2022-02-25 NOTE — CARE COORDINATION
LSW spoke with nursing regarding concerns for pt. Pt delivered at home and had only one prenatal visit during her pregnancy. Pt was reluctant to give a urine sample and threw away baby's diaper so we could not test.  When pt finally gave a small sample it was positive benzo and THC and Opiate. Pt did get a percocet here upon arrival.  Pt says that she lives at home with her other kids. FOB was here but they do not live together. Pt's sister was present in the room and is providing care for the new baby. LSW called [facility] and spoke with Luma in intake. Referral information was provided and [facility] will follow with pt. Pt has a past hx with her last delivery and was positive for opiates and THC. [facility] worker (FREDIS [phone]) here to see pt. Pt has a past hx of drug use. [facility] is requesting that we keep baby to monitor for withdrawal as we have no prenatal screens to go by to know the level of exposure.

## 2022-02-26 PROCEDURE — 6370000000 HC RX 637 (ALT 250 FOR IP): Performed by: OBSTETRICS & GYNECOLOGY

## 2022-02-26 PROCEDURE — 7200000001 HC VAGINAL DELIVERY

## 2022-02-26 PROCEDURE — 1220000000 HC SEMI PRIVATE OB R&B

## 2022-02-26 PROCEDURE — 99238 HOSP IP/OBS DSCHRG MGMT 30/<: CPT | Performed by: OBSTETRICS & GYNECOLOGY

## 2022-02-26 RX ADMIN — DOCUSATE SODIUM 100 MG: 100 CAPSULE, LIQUID FILLED ORAL at 08:45

## 2022-02-26 RX ADMIN — IBUPROFEN 600 MG: 600 TABLET ORAL at 02:57

## 2022-02-26 ASSESSMENT — PAIN SCALES - GENERAL
PAINLEVEL_OUTOF10: 0
PAINLEVEL_OUTOF10: 7
PAINLEVEL_OUTOF10: 0
PAINLEVEL_OUTOF10: 0

## 2022-02-26 ASSESSMENT — ENCOUNTER SYMPTOMS
NAUSEA: 0
ABDOMINAL PAIN: 0
COUGH: 0
SORE THROAT: 0
SHORTNESS OF BREATH: 0

## 2022-02-26 NOTE — FLOWSHEET NOTE
Patient tried to leave floor. Explained that hospital is liable. Educated her on importance of staying.  Patient considering leaving AMA at this time

## 2022-02-26 NOTE — DISCHARGE SUMMARY
Discharge Summary    Date: 2022  Patient Name: Suzie López YOB: 1995 Age: 27 y.o. Admit Date: 2022  Discharge Date: 2022  Discharge Condition: Stable    Admission Diagnosis   labor with delivery (O60.10X0)     Discharge Diagnosis  Principal Problem:  labor with deliveryResolved Problems: * No resolved hospital problems. [facility] Stay  Narrative of Hospital Course:      Consultants:  IP CONSULT TO SOCIAL WORK    Surgeries/procedures Performed:       Treatments:           Discharge Plan/Disposition:  Home    Hospital/Incidental Findings Requiring Follow Up:    Patient Instructions:    Diet: Regular Diet    Activity:Activity as Tolerated  For number of days (if applicable): Other Instructions: F/u w/ob/gyn in 6 wks    Provider Follow-Up:   No follow-ups on file.      Significant Diagnostic Studies:    Recent Labs:  Admission on BO/Rh                                      Date: 2022Value: O POS         Status: FinalAntibody Screen                               Date: 2022Value: NEG           Status: [facility] [facility]                                           Date: 2022Value: 12.5*       Ref range: 4.8 - 10.8 K/uL    Status: FinalRBC                                           Date: 2022Value: 4.51        Ref range: 4.20 - 5.40 M/uL   Status: FinalHemoglobin                                    Date: 2022Value: 12.5        Ref range: 12.0 - 16.0 g/dL   Status: FinalHematocrit                                    Date: 2022Value: 37.3        Ref range: 37.0 - 47.0 %      Status: FinalMCV                                           Date: 2022Value: 82.7        Ref range: 82.0 - 100.0 fL    Status: [address]                                           Date: 2022Value: 27.8        Ref range: 27.0 - 31.3 pg     Status:  [address]                                          Date: 2022Value: 33.6        Ref range: 33.0 - 37.0 % Status: FinalRDW                                           Date: 02/25/2022Value: 13.2        Ref range: 11.5 - 14.5 %      Status: FinalPlatelets                                     Date: 02/25/2022Value: 240         Ref range: 130 - 400 K/uL     Status: FinalNeutrophils %                                 Date: 02/25/2022Value: 84.3        Ref range: %                  Status: FinalLymphocytes %                                 Date: 02/25/2022Value: 8.0         Ref range: %                  Status: FinalMonocytes %                                   Date: 02/25/2022Value: 7.4         Ref range: %                  Status: FinalEosinophils %                                 Date: 02/25/2022Value: 0.1         Ref range: %                  Status: FinalBasophils %                                   Date: 02/25/2022Value: 0.2         Ref range: %                  Status: FinalNeutrophils Absolute                          Date: 02/25/2022Value: 10.5*       Ref range: 1.4 - 6.5 K/uL     Status: FinalLymphocytes Absolute                          Date: 02/25/2022Value: 1.0         Ref range: 1.0 - 4.8 K/uL     Status: FinalMonocytes Absolute                            Date: 02/25/2022Value: 0.9*        Ref range: 0.2 - 0.8 K/uL     Status: FinalEosinophils Absolute                          Date: 02/25/2022Value: 0.0         Ref range: 0.0 - 0.7 K/uL     Status: FinalBasophils Absolute                            Date: 02/25/2022Value: 0.0         Ref range: 0.0 - 0.2 K/uL     Status: FinalHep B S Ag Interp                             Date: 02/25/2022Value: Non-reactive                     Status: FinalRPR                                           Date: 02/25/2022Value: Non-reactive                   Ref range: Non-reactive       Status: FinalRubella Antibody IgG                          Date: 02/25/2022Value: 165.5       Ref range: IU/mL              Status: Final              Comment: Patient's result indicates immunity. Default Normal Ranges>=10 Presumed Immune<10  Presumed Not immuneHIV Ag/Ab                                     Date: 02/25/2022Value: NONREACTIVE Ref range: NR                 Status: Final              Comment: No laboratory evidence of HIV infection. If acute HIV infection issuspected, consider testing for HIV-1 RNA. [address] [place] [address] Drive [address] [facility], [address] [place] [address] ([phone]                                        Date: 02/25/2022Value: 136         Ref range: 135 - 144 mEq/L    Status: FinalPotassium                                     Date: 02/25/2022Value: 2.9*        Ref range: 3.4 - 4.9 mEq/L    Status: Final              Comment: VERIFIED BY REPEAT ANALYSISChloride                                      Date: 02/25/2022Value: 93*         Ref range: 95 - 107 mEq/L     Status: FinalCO2                                           Date: 02/25/2022Value: 29          Ref range: 20 - 31 mEq/L      Status: FinalAnion Gap                                     Date: 02/25/2022Value: 14          Ref range: 9 - 15 mEq/L       Status: FinalGlucose                                       Date: 02/25/2022Value: 94          Ref range: 70 - 99 mg/dL      Status: FinalBUN                                           Date: 02/25/2022Value: 10          Ref range: 6 - 20 mg/dL       Status: FinalCREATININE                                    Date: 02/25/2022Value: 0.47*       Ref range: 0.50 - 0.90 mg/dL  Status: FinalGFR Non-                      Date: 02/25/2022Value: >60.0       Ref range: >60                Status: Final              Comment: >60 mL/min/1.73m2 EGFR, calc. for ages 25 and older using theMDRD formula (not corrected for weight), is valid for stablerenal function. GFR                           Date: 02/25/2022Value: >60.0       Ref range: >60                Status: Final              Comment: >60 mL/min/1.73m2 EGFR, calc. for ages 25 and older using theMDRD formula (not range: Straw/Yellow       Status: FinalClarity, UA                                   Date: 02/25/2022Value: TURBID*     Ref range: Clear              Status: FinalGlucose, Ur                                   Date: 02/25/2022Value: Negative    Ref range: Negative mg/dL     Status: FinalBilirubin Urine                               Date: 02/25/2022Value: MODERATE*   Ref range: Negative           Status: FinalKetones, Urine                                Date: 02/25/2022Value: Negative    Ref range: Negative mg/dL     Status: FinalSpecific Gravity, UA                          Date: 02/25/2022Value: 1.025       Ref range: 1.005 - 1.030      Status: FinalBlood, Urine                                  Date: 02/25/2022Value: MODERATE*   Ref range: Negative           Status: FinalpH, UA                                        Date: 02/25/2022Value: >=9.0       Ref range: 5.0 - 9.0          Status: FinalProtein, UA                                   Date: 02/25/2022Value: >=300*      Ref range: Negative mg/dL     Status: FinalUrobilinogen, Urine                           Date: 02/25/2022Value: 0.2         Ref range: <2.0 E.U./dL       Status: FinalNitrite, Urine                                Date: 02/25/2022Value: POSITIVE*   Ref range: Negative           Status: FinalLeukocyte Esterase, Urine                     Date: 02/25/2022Value: LARGE*      Ref range: Negative           Status: FinalAmphetamine Screen, Urine                     Date: 02/25/2022Value: Neg         Ref range: Negative <1000 n*  Status: FinalBarbiturate Screen, Ur                        Date: 02/25/2022Value: Neg         Ref range: Negative < 200 n*  Status: FinalBenzodiazepine Screen, Urine                  Date: 02/25/2022Value: POSITIVE*   Ref range: Negative < 200 n*  Status: FinalCannabinoid Scrn, Ur                          Date: 02/25/2022Value: POSITIVE*   Ref range: Negative < 50 ng*  Status: FinalCocaine Metabolite Screen, Urine 2    Current Discharge Medication List    Time Spent on Discharge:E] minutes were spent in patient examination, evaluation, counseling as well as medication reconciliation, prescriptions for required medications, discharge plan, and follow up.     Electronically signed by Cesar Henning DO on 2/26/22 at 2:35 PM EST

## 2022-02-26 NOTE — PROGRESS NOTES
Discharge papers brought to patient for review, questions answered, pt would like to wait to be discharged till later tonight

## 2022-02-26 NOTE — PROGRESS NOTES
Pt moved to room 329 from room 321, pt oriented to room and call light, mother and boyfriend supportive in room

## 2022-02-26 NOTE — PROGRESS NOTES
Progress Note  Date:2022       Room:0321/0321-01  Patient 1 Medical [facility]      YOB: 1995     Age:26 y.o. Subjective    Subjective:  Symptoms:  Stable. No shortness of breath, cough or chest pain. Diet:  Adequate intake. No nausea. Activity level: Normal.    Pain:  She complains of pain that is mild. She reports pain is improving. Pain is well controlled. Review of Systems   Constitutional: Negative for fever. HENT: Negative for sore throat. Respiratory: Negative for cough and shortness of breath. Cardiovascular: Negative for chest pain. Gastrointestinal: Negative for abdominal pain and nausea. Genitourinary: Negative for difficulty urinating. Neurological: Negative for dizziness. All other systems reviewed and are negative. Objective         Vitals Last 24 Hours:  TEMPERATURE:  Temp  Av.9 °F (36.6 °C)  Min: 97.6 °F (36.4 °C)  Max: 98.1 °F (36.7 °C)  RESPIRATIONS RANGE: Resp  Av  Min: 16  Max: 18  PULSE OXIMETRY RANGE: No data recorded  PULSE RANGE: Pulse  Av.3  Min: 73  Max: 88  BLOOD PRESSURE RANGE: Systolic (21NMH), AJD:458 , Min:101 , MJX:957   ; Diastolic (74PQA), VQM:54, Min:57, Max:75    I/O (24Hr): Intake/Output Summary (Last 24 hours) at 2022 0944  Last data filed at 2022 1345  Gross per 24 hour   Intake --   Output 200 ml   Net -200 ml     Objective:  General Appearance:  Comfortable, well-appearing and in no acute distress. Vital signs: (most recent): Blood pressure (!) 105/57, pulse 83, temperature 97.9 °F (36.6 °C), temperature source Oral, resp. rate 16, last menstrual period 2021, unknown if currently breastfeeding. Vital signs are normal.  No fever. Output: Producing urine. HEENT: Normal HEENT exam.    Lungs:  Normal effort. Heart: Normal rate. Abdomen: Abdomen is soft. There is no abdominal tenderness. (Uterine fundus firm  Lochia minimal). Neurological: Patient is alert.     Skin: Warm and dry. Labs/Imaging/Diagnostics    Labs:  CBC:  Recent Labs     22   WBC 12.5*   RBC 4.51   HGB 12.5   HCT 37.3   MCV 82.7   RDW 13.2        CHEMISTRIES:  Recent Labs     22      K 2.9*   CL 93*   CO2 29   BUN 10   CREATININE 0.47*   GLUCOSE 94     PT/INR:No results for input(s): PROTIME, INR in the last 72 hours. APTT:No results for input(s): APTT in the last 72 hours. LIVER PROFILE:  Recent Labs     22   AST 94*   *   BILITOT 0.6   ALKPHOS 208*       Imaging Last 24 Hours:  No results found. Assessment//Plan           Hospital Problems           Last Modified POA    * (Principal)  labor with delivery 2022 Yes        Assessment:    Condition: In stable condition. Improving. (S/p  PPD1). Plan:   Encourage ambulation. Regular diet. (Cont post partum care).        Electronically signed by Pierre Galloway DO on 22 at 9:44 AM EST

## 2022-02-27 VITALS
SYSTOLIC BLOOD PRESSURE: 120 MMHG | RESPIRATION RATE: 16 BRPM | TEMPERATURE: 98 F | DIASTOLIC BLOOD PRESSURE: 62 MMHG | HEART RATE: 80 BPM

## 2022-02-27 PROCEDURE — 6370000000 HC RX 637 (ALT 250 FOR IP): Performed by: OBSTETRICS & GYNECOLOGY

## 2022-02-27 PROCEDURE — 99238 HOSP IP/OBS DSCHRG MGMT 30/<: CPT | Performed by: OBSTETRICS & GYNECOLOGY

## 2022-02-27 RX ADMIN — DOCUSATE SODIUM 100 MG: 100 CAPSULE, LIQUID FILLED ORAL at 09:05

## 2022-02-27 RX ADMIN — IBUPROFEN 600 MG: 600 TABLET ORAL at 09:05

## 2022-02-27 ASSESSMENT — PAIN SCALES - GENERAL
PAINLEVEL_OUTOF10: 0
PAINLEVEL_OUTOF10: 6

## 2022-02-27 NOTE — DISCHARGE SUMMARY
Discharge Summary    Date: 2022  Patient Name: Riky Monroe YOB: 1995 Age: 27 y.o. Admit Date: 2022  Discharge Date: 2022  Discharge Condition: Good    Admission Diagnosis   labor with delivery (O60.10X0)     Discharge Diagnosis  Principal Problem (Resolved):  labor with deliveryActive Problems: * No active hospital problems. [facility] Stay  Narrative of Hospital Course:  Stable postpartum day #2 vaginal delivery    Consultants:  IP CONSULT TO SOCIAL WORK    Surgeries/procedures Performed:       Treatments:           Discharge Plan/Disposition:  Home    Hospital/Incidental Findings Requiring Follow Up:    Patient Instructions:    Diet: Regular Diet    Activity:Activiity as Tolerated, No Driving for Today, No Lifting, Driving or Strenuous Excercise, No Driving for 2 Weeks, No Sex for, No Driving While on Analgesics and No Heavy Lifting  For number of days (if applicable): Other Instructions:    Provider Follow-Up:   No follow-ups on file.      Significant Diagnostic Studies:    Recent Labs:  Admission on BO/Rh                                      Date: 2022Value: O POS         Status: FinalAntibody Screen                               Date: 2022Value: NEG           Status: [address] [facility]                                           Date: 2022Value: 12.5*       Ref range: 4.8 - 10.8 K/uL    Status: FinalRBC                                           Date: 2022Value: 4.51        Ref range: 4.20 - 5.40 M/uL   Status: FinalHemoglobin                                    Date: 2022Value: 12.5        Ref range: 12.0 - 16.0 g/dL   Status: FinalHematocrit                                    Date: 2022Value: 37.3        Ref range: 37.0 - 47.0 %      Status: FinalMCV                                           Date: 2022Value: 82.7        Ref range: 82.0 - 100.0 fL    Status: [address] [facility]                                           Date: 02/25/2022Value: 27.8        Ref range: 27.0 - 31.3 pg     Status: [address]                                          Date: 02/25/2022Value: 33.6        Ref range: 33.0 - 37.0 %      Status: FinalRDW                                           Date: 02/25/2022Value: 13.2        Ref range: 11.5 - 14.5 %      Status: FinalPlatelets                                     Date: 02/25/2022Value: 240         Ref range: 130 - 400 K/uL     Status: FinalNeutrophils %                                 Date: 02/25/2022Value: 84.3        Ref range: %                  Status: FinalLymphocytes %                                 Date: 02/25/2022Value: 8.0         Ref range: %                  Status: FinalMonocytes %                                   Date: 02/25/2022Value: 7.4         Ref range: %                  Status: FinalEosinophils %                                 Date: 02/25/2022Value: 0.1         Ref range: %                  Status: FinalBasophils %                                   Date: 02/25/2022Value: 0.2         Ref range: %                  Status: FinalNeutrophils Absolute                          Date: 02/25/2022Value: 10.5*       Ref range: 1.4 - 6.5 K/uL     Status: FinalLymphocytes Absolute                          Date: 02/25/2022Value: 1.0         Ref range: 1.0 - 4.8 K/uL     Status: FinalMonocytes Absolute                            Date: 02/25/2022Value: 0.9*        Ref range: 0.2 - 0.8 K/uL     Status: FinalEosinophils Absolute                          Date: 02/25/2022Value: 0.0         Ref range: 0.0 - 0.7 K/uL     Status: FinalBasophils Absolute                            Date: 02/25/2022Value: 0.0         Ref range: 0.0 - 0.2 K/uL     Status: FinalHep B S Ag Interp                             Date: 02/25/2022Value: Non-reactive                     Status: FinalRPR                                           Date: 02/25/2022Value: Non-reactive                   Ref range: Non-reactive       Status: [place] Antibody IgG                          Date: 02/25/2022Value: 165.5       Ref range: IU/mL              Status: Final              Comment: Patient's result indicates immunity. Default Normal Ranges>=10 Presumed Immune<10  Presumed Not immuneHIV Ag/Ab                                     Date: 02/25/2022Value: NONREACTIVE Ref range: NR                 Status: Final              Comment: No laboratory evidence of HIV infection. If acute HIV infection issuspected, consider testing for HIV-1 RNA. [address], [address] [place] [address] [phone]                                        Date: 02/25/2022Value: 136         Ref range: 135 - 144 mEq/L    Status: FinalPotassium                                     Date: 02/25/2022Value: 2.9*        Ref range: 3.4 - 4.9 mEq/L    Status: Final              Comment: VERIFIED BY REPEAT ANALYSISChloride                                      Date: 02/25/2022Value: 93*         Ref range: 95 - 107 mEq/L     Status: FinalCO2                                           Date: 02/25/2022Value: 29          Ref range: 20 - 31 mEq/L      Status: FinalAnion Gap                                     Date: 02/25/2022Value: 14          Ref range: 9 - 15 mEq/L       Status: FinalGlucose                                       Date: 02/25/2022Value: 94          Ref range: 70 - 99 mg/dL      Status: FinalBUN                                           Date: 02/25/2022Value: 10          Ref range: 6 - 20 mg/dL       Status: FinalCREATININE                                    Date: 02/25/2022Value: 0.47*       Ref range: 0.50 - 0.90 mg/dL  Status: FinalGFR Non-                      Date: 02/25/2022Value: >60.0       Ref range: >60                Status: Final              Comment: >60 mL/min/1.73m2 EGFR, calc. for ages 25 and older using theMDRD formula (not corrected for weight), is valid for stablerenal function. GFR                           Date: 02/25/2022Value: >60.0       Ref range: >60                Status: Final              Comment: >60 mL/min/1.73m2 EGFR, calc. for ages 25 and older using theMDRD formula (not corrected for weight), is valid for stablerenal function. Calcium                                       Date: 02/25/2022Value: 8.9         Ref range: 8.5 - 9.9 mg/dL    Status: FinalTotal Protein                                 Date: 02/25/2022Value: 7.0         Ref range: 6.3 - 8.0 g/dL     Status: FinalAlbumin                                       Date: 02/25/2022Value: 3.7         Ref range: 3.5 - 4.6 g/dL     Status: FinalTotal Bilirubin                               Date: 02/25/2022Value: 0.6         Ref range: 0.2 - 0.7 mg/dL    Status: FinalAlkaline Phosphatase                          Date: 02/25/2022Value: 208*        Ref range: 40 - 130 U/L       Status: FinalALT                                           Date: 02/25/2022Value: 122*        Ref range: 0 - 33 U/L         Status: FinalAST                                           Date: 02/25/2022Value: 94*         Ref range: 0 - 35 U/L         Status: FinalGlobulin                                      Date: 02/25/2022Value: 3.3         Ref range: 2.3 - 3.5 g/dL     Status: [id number]-6, NAAT                              Date: 02/25/2022Value: Not Detected                   Ref range: Not Detected       Status: Final              Comment: Rapid NAAT:   Negative results should be treated as presumptive and,if inconsistent with clinical signs and symptoms or necessary forpatient management, should be tested with an alternative molecularassay. Negative results do not preclude SARS-CoV-2 infection andshould not be used as the sole basis for patient management decisions. This test has been authorized by the FDA under an Emergency UseAuthorization (EUA) for use by authorized laboratories. Fact sheet for Healthcare [employer] sheet for Patients: Devan[employer]dk: Isothermal Nucleic Acid AmplificationColor, UA                                     Date: 02/25/2022Value: RED*        Ref range: Straw/Yellow       Status: FinalClarity, UA                                   Date: 02/25/2022Value: TURBID*     Ref range: Clear              Status: FinalGlucose, Ur                                   Date: 02/25/2022Value: Negative    Ref range: Negative mg/dL     Status: FinalBilirubin Urine                               Date: 02/25/2022Value: MODERATE*   Ref range: Negative           Status: FinalKetones, Urine                                Date: 02/25/2022Value: Negative    Ref range: Negative mg/dL     Status: FinalSpecific Gravity, UA                          Date: 02/25/2022Value: 1.025       Ref range: 1.005 - 1.030      Status: FinalBlood, Urine                                  Date: 02/25/2022Value: MODERATE*   Ref range: Negative           Status: FinalpH, UA                                        Date: 02/25/2022Value: >=9.0       Ref range: 5.0 - 9.0          Status: FinalProtein, UA                                   Date: 02/25/2022Value: >=300*      Ref range: Negative mg/dL     Status: FinalUrobilinogen, Urine                           Date: 02/25/2022Value: 0.2         Ref range: <2.0 E.U./dL       Status: FinalNitrite, Urine                                Date: 02/25/2022Value: POSITIVE*   Ref range: Negative           Status: FinalLeukocyte Esterase, Urine                     Date: 02/25/2022Value: LARGE*      Ref range: Negative           Status: FinalAmphetamine Screen, Urine                     Date: 02/25/2022Value: Neg         Ref range: Negative <1000 n*  Status: FinalBarbiturate Screen, Ur                        Date: 02/25/2022Value: Neg         Ref range: Negative < 200 n*  Status: FinalBenzodiazepine Screen, Urine                  Date: 02/25/2022Value: POSITIVE*   Ref range: Negative < 200 n*  Status: Iain Mckinney, Ur                          Date: 02/25/2022Value: POSITIVE*   Ref range: Negative < 50 ng*  Status: FinalCocaine Metabolite Screen, Urine              Date: 02/25/2022Value: Neg         Ref range: Negative < 300 n*  Status: FinalOpiate Scrn, Ur                               Date: 02/25/2022Value: Neg         Ref range: Negative < 300 n*  Status: FinalPCP Screen, Urine                             Date: 02/25/2022Value: Neg         Ref range: Negative < 25 ng*  Status: FinalMethadone Screen, Urine                       Date: 02/25/2022Value: Neg         Ref range: Negative <300 ng*  Status: FinalPropoxyphene Scrn, Ur                         Date: 02/25/2022Value: Neg         Ref range: Negative <300 ng*  Status: FinalOxycodone Urine                               Date: 02/25/2022Value: POSITIVE*   Ref range: Negative <100 ng*  Status: FinalDrug Screen Comment:                          Date: 02/25/2022Value: see below     Status: Final              Comment: This method is a screening test to detect only these drugclasses as part of a medical workup. Confirmatory testingby another method should be ordered if clinically indicated. Bacteria, UA                                  Date: 02/25/2022Value: MANY*       Ref range: Negative /HPF      Status: [address] [facility], UA                                       Date: 02/25/2022Value: 10-20*      Ref range: 0 - 5 /HPF         Status: FinalRBC, UA                                       Date: 02/25/2022Value: >100*       Ref range: 0 - 5 /HPF         Status: FinalEpithelial Cells, UA                          Date: 02/25/2022Value: 6-10        Ref range: 0 - 5 /HPF         Status: Final------------    Radiology last 7 days:  No results found.      Pending Labs   Order Current Status  CBC Collected (02/26/22 6772)      Discharge Medications    Current Discharge Medication List    Current Discharge Medication List    Current Discharge Medication List    Current Discharge Medication ListSTOP taking these medicationsondansetron (ZOFRAN-ODT) 4 MG disintegrating tabletComments:Reason for Stopping:    Time Spent on Discharge:1E] minutes were spent in patient examination, evaluation, counseling as well as medication reconciliation, prescriptions for required medications, discharge plan, and follow up.     Electronically signed by Francisco Javier Joe MD on 2/27/22 at 8:37 AM EST